# Patient Record
Sex: MALE | Race: WHITE | NOT HISPANIC OR LATINO | URBAN - METROPOLITAN AREA
[De-identification: names, ages, dates, MRNs, and addresses within clinical notes are randomized per-mention and may not be internally consistent; named-entity substitution may affect disease eponyms.]

---
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
## 2021-11-23 PROBLEM — Z00.00 ENCOUNTER FOR PREVENTIVE HEALTH EXAMINATION: Status: ACTIVE | Noted: 2021-11-23

## 2021-11-24 ENCOUNTER — APPOINTMENT (OUTPATIENT)
Dept: FAMILY MEDICINE | Facility: CLINIC | Age: 21
End: 2021-11-24
Payer: COMMERCIAL

## 2021-11-24 VITALS
TEMPERATURE: 97.2 F | WEIGHT: 153 LBS | OXYGEN SATURATION: 99 % | HEART RATE: 85 BPM | HEIGHT: 72 IN | DIASTOLIC BLOOD PRESSURE: 60 MMHG | SYSTOLIC BLOOD PRESSURE: 92 MMHG | BODY MASS INDEX: 20.72 KG/M2

## 2021-11-24 DIAGNOSIS — F45.8 OTHER SOMATOFORM DISORDERS: ICD-10-CM

## 2021-11-24 PROCEDURE — 99214 OFFICE O/P EST MOD 30 MIN: CPT | Mod: 25

## 2021-11-24 PROCEDURE — 36415 COLL VENOUS BLD VENIPUNCTURE: CPT

## 2021-11-24 NOTE — HEALTH RISK ASSESSMENT
[1] : 2) Feeling down, depressed, or hopeless for several days (1) [PHQ-9 Negative - No further assessment needed] : PHQ-9 Negative - No further assessment needed [[id number]] : 2

## 2021-11-24 NOTE — PLAN
[FreeTextEntry1] : 21-year-old male presents to this office complaining of fatigue and occasional dyspnea\par Fatigue–he says for the last 2 weeks he has not been feeling well and might be coming down with a cold\par A CBC and BMP are ordered\par Dyspnea–periods of tachypnea are described worse on exertion.  His O2 sat is 99% on room air\par Hypotension–blood pressure 92/60 no current medications\par Lab work is ordered

## 2021-11-24 NOTE — HISTORY OF PRESENT ILLNESS
[FreeTextEntry1] : Fatigue [de-identified] : 21-year-old gentleman presents with periods of fatigue and breathing issues

## 2021-11-26 LAB
ANION GAP SERPL CALC-SCNC: 10 MMOL/L
BASOPHILS # BLD AUTO: 0.03 K/UL
BASOPHILS NFR BLD AUTO: 0.7 %
BUN SERPL-MCNC: 17 MG/DL
CALCIUM SERPL-MCNC: 9.8 MG/DL
CHLORIDE SERPL-SCNC: 107 MMOL/L
CO2 SERPL-SCNC: 26 MMOL/L
CREAT SERPL-MCNC: 0.97 MG/DL
EOSINOPHIL # BLD AUTO: 0.32 K/UL
EOSINOPHIL NFR BLD AUTO: 7.3 %
GLUCOSE SERPL-MCNC: 88 MG/DL
HCT VFR BLD CALC: 48.7 %
HGB BLD-MCNC: 15.6 G/DL
IMM GRANULOCYTES NFR BLD AUTO: 0.2 %
LYMPHOCYTES # BLD AUTO: 1.57 K/UL
LYMPHOCYTES NFR BLD AUTO: 35.8 %
MAN DIFF?: NORMAL
MCHC RBC-ENTMCNC: 28 PG
MCHC RBC-ENTMCNC: 32 GM/DL
MCV RBC AUTO: 87.3 FL
MONOCYTES # BLD AUTO: 0.61 K/UL
MONOCYTES NFR BLD AUTO: 13.9 %
NEUTROPHILS # BLD AUTO: 1.84 K/UL
NEUTROPHILS NFR BLD AUTO: 42.1 %
PLATELET # BLD AUTO: 221 K/UL
POTASSIUM SERPL-SCNC: 4.6 MMOL/L
RBC # BLD: 5.58 M/UL
RBC # FLD: 12.1 %
SODIUM SERPL-SCNC: 143 MMOL/L
WBC # FLD AUTO: 4.38 K/UL

## 2022-08-14 ENCOUNTER — EMERGENCY (EMERGENCY)
Facility: HOSPITAL | Age: 22
LOS: 1 days | Discharge: HOME | End: 2022-08-14
Admitting: EMERGENCY MEDICINE

## 2022-08-14 DIAGNOSIS — M25.512 PAIN IN LEFT SHOULDER: ICD-10-CM
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
## 2022-08-14 DIAGNOSIS — S42.002A FRACTURE OF UNSPECIFIED PART OF LEFT CLAVICLE, INITIAL ENCOUNTER FOR CLOSED FRACTURE: ICD-10-CM

## 2022-08-14 PROCEDURE — 23500 CLTX CLAVICULAR FX W/O MNPJ: CPT | Mod: 54

## 2022-08-14 PROCEDURE — 73030 X-RAY EXAM OF SHOULDER: CPT | Mod: 26,LT

## 2022-08-14 PROCEDURE — 99284 EMERGENCY DEPT VISIT MOD MDM: CPT | Mod: 57

## 2022-08-14 PROCEDURE — 73000 X-RAY EXAM OF COLLAR BONE: CPT | Mod: 26,LT

## 2022-08-29 ENCOUNTER — OUTPATIENT (OUTPATIENT)
Dept: OUTPATIENT SERVICES | Facility: HOSPITAL | Age: 22
LOS: 1 days | End: 2022-08-29
Payer: MEDICARE

## 2022-08-29 ENCOUNTER — OUTPATIENT (OUTPATIENT)
Dept: OUTPATIENT SERVICES | Facility: HOSPITAL | Age: 22
LOS: 1 days | End: 2022-08-29

## 2022-08-29 PROCEDURE — L9981: CPT
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
## 2022-09-04 DIAGNOSIS — S42.022A DISPLACED FRACTURE OF SHAFT OF LEFT CLAVICLE, INITIAL ENCOUNTER FOR CLOSED FRACTURE: ICD-10-CM
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
## 2022-09-20 DIAGNOSIS — S42.022A DISPLACED FRACTURE OF SHAFT OF LEFT CLAVICLE, INITIAL ENCOUNTER FOR CLOSED FRACTURE: ICD-10-CM

## 2023-05-18 ENCOUNTER — RESULT CHARGE (OUTPATIENT)
Age: 23
End: 2023-05-18

## 2023-05-18 ENCOUNTER — APPOINTMENT (OUTPATIENT)
Dept: FAMILY MEDICINE | Facility: CLINIC | Age: 23
End: 2023-05-18
Payer: COMMERCIAL

## 2023-05-18 VITALS
BODY MASS INDEX: 25.2 KG/M2 | WEIGHT: 180 LBS | HEART RATE: 86 BPM | OXYGEN SATURATION: 97 % | HEIGHT: 71 IN | TEMPERATURE: 97.3 F

## 2023-05-18 DIAGNOSIS — J98.01 ACUTE BRONCHOSPASM: ICD-10-CM

## 2023-05-18 DIAGNOSIS — Q67.6 PECTUS EXCAVATUM: ICD-10-CM

## 2023-05-18 DIAGNOSIS — R04.0 EPISTAXIS: ICD-10-CM

## 2023-05-18 DIAGNOSIS — Z91.09 OTHER ALLERGY STATUS, OTHER THAN TO DRUGS AND BIOLOGICAL SUBSTANCES: ICD-10-CM

## 2023-05-18 PROCEDURE — 81003 URINALYSIS AUTO W/O SCOPE: CPT | Mod: QW

## 2023-05-18 PROCEDURE — 99214 OFFICE O/P EST MOD 30 MIN: CPT | Mod: 25

## 2023-05-23 ENCOUNTER — NON-APPOINTMENT (OUTPATIENT)
Age: 23
End: 2023-05-23

## 2023-05-23 ENCOUNTER — APPOINTMENT (OUTPATIENT)
Dept: PULMONOLOGY | Facility: CLINIC | Age: 23
End: 2023-05-23
Payer: COMMERCIAL

## 2023-05-23 VITALS
OXYGEN SATURATION: 98 % | SYSTOLIC BLOOD PRESSURE: 110 MMHG | BODY MASS INDEX: 25.62 KG/M2 | HEART RATE: 82 BPM | DIASTOLIC BLOOD PRESSURE: 70 MMHG | HEIGHT: 71 IN | TEMPERATURE: 97.2 F | WEIGHT: 183 LBS

## 2023-05-23 DIAGNOSIS — J32.9 CHRONIC SINUSITIS, UNSPECIFIED: ICD-10-CM

## 2023-05-23 LAB
BILIRUB UR QL STRIP: NEGATIVE
CLARITY UR: CLEAR
COLLECTION METHOD: NORMAL
GLUCOSE UR-MCNC: NEGATIVE
HCG UR QL: 1 EU/DL
HGB UR QL STRIP.AUTO: NEGATIVE
KETONES UR-MCNC: NEGATIVE
LEUKOCYTE ESTERASE UR QL STRIP: NEGATIVE
NITRITE UR QL STRIP: NEGATIVE
PH UR STRIP: 7
PROT UR STRIP-MCNC: 30
SP GR UR STRIP: 1.02

## 2023-05-23 PROCEDURE — 99204 OFFICE O/P NEW MOD 45 MIN: CPT | Mod: 25

## 2023-05-23 PROCEDURE — 94010 BREATHING CAPACITY TEST: CPT

## 2023-05-23 RX ORDER — LORATADINE 5 MG/5 ML
SOLUTION, ORAL ORAL
Refills: 0 | Status: ACTIVE | COMMUNITY

## 2023-05-23 RX ORDER — IPRATROPIUM BROMIDE 21 UG/1
0.03 SPRAY NASAL TWICE DAILY
Qty: 1 | Refills: 6 | Status: ACTIVE | COMMUNITY
Start: 2023-05-23 | End: 1900-01-01

## 2023-05-23 RX ORDER — CETIRIZINE HCL 10 MG
TABLET ORAL
Refills: 0 | Status: ACTIVE | COMMUNITY

## 2023-05-23 RX ORDER — FLUTICASONE PROPIONATE 50 MCG
SPRAY, SUSPENSION NASAL
Refills: 0 | Status: ACTIVE | COMMUNITY

## 2023-05-23 NOTE — HISTORY OF PRESENT ILLNESS
[Never] : never [TextBox_4] : 22 male no hx tobacco no vaping\par Presents today because  recent visit to PCP  for 2 months of  dyspnea and stuffy feeling  and breathing thru mouth\par dx severe allergies  and asthma  \par Pt to see allergist \par no meds started\par sl  wheeze  and min issue nasal congestion and occ epistaxis\par no pruritis or pharyngitis\par precipitating factors worse in heat and cam feel worse and lying down\par lives with dog  no issue prior no cats or birds\par no new environment   recent returned from college and had sx there as well\par student  graduated

## 2023-05-23 NOTE — REASON FOR VISIT
[Initial] : an initial visit [Cough] : cough [Shortness of Breath] : shortness of breath [Wheezing] : wheezing [TextBox_44] : Pt states he has had a hard time breathing through his nose for about 2 months and has been worse for the past 3 weeks. Pt states he has been getting a lot of nose bleeds while trying to relieve his symptoms. Pt states he sometimes has a wecough that turns into a dry cough after getting up the phlegm, SOB with and without activity. Pt states his PCP hears "cracking" in his chest.

## 2023-05-23 NOTE — PROCEDURE
[FreeTextEntry1] : Spirometry shows no obstructive or restrictive disease.  predictive value and percentage is incorrect because height entered incorrectly.

## 2023-05-23 NOTE — DISCUSSION/SUMMARY
[FreeTextEntry1] : Mr Sykes complains of significant chest congestion and sinus congestion.  It sounds more like allergies rather than asthma.  Although the symptoms have been for several months there is no definite new environmental allergen.  I have asked him to obtain sinus x-rays and a chest x-ray.  At this time I do not want to start him on any asthma medication.  I think he benefit from a nasal decongestant.  I have asked him to start Atrovent nasal spray 2 sprays in each nostril twice a day.  He will be going to an allergist where full allergy testing will be done.\par The patient understands and agrees with plan of care.\par Today's office visit encompassed 46  minutes. I conducted an extensive history ,physical exam and reviewed diagnosis and treatment options  including diagnostic tests,radiologic studies including cat scans  and the use of prescription medication.

## 2023-05-23 NOTE — PHYSICAL EXAM
[No Acute Distress] : no acute distress [Normal Oropharynx] : normal oropharynx [Normal Appearance] : normal appearance [No Neck Mass] : no neck mass [Normal Rate/Rhythm] : normal rate/rhythm [Normal S1, S2] : normal s1, s2 [No Murmurs] : no murmurs [No Resp Distress] : no resp distress [Clear to Auscultation Bilaterally] : clear to auscultation bilaterally [No Abnormalities] : no abnormalities [Benign] : benign [Normal Gait] : normal gait [No Clubbing] : no clubbing [No Cyanosis] : no cyanosis [No Edema] : no edema [FROM] : FROM [Normal Color/ Pigmentation] : normal color/ pigmentation [No Focal Deficits] : no focal deficits [Oriented x3] : oriented x3 [Normal Affect] : normal affect [TextBox_11] : minimal tender over sinus

## 2023-06-07 NOTE — HEALTH RISK ASSESSMENT
[0] : 2) Feeling down, depressed, or hopeless: Not at all (0) [PHQ-2 Negative - No further assessment needed] : PHQ-2 Negative - No further assessment needed [[id number]] : 0

## 2023-06-07 NOTE — PLAN
[FreeTextEntry1] : 22-year-old male presents for evaluation\par Epistaxis–frequent nosebleeds–physical exam is unremarkable some punctate lesions in the turbinates.  He is advised to keep the nasal passages moist\par Atypical chest pain–complaining of bronchospasm.  He suffers from pectus excavatum.\par Pulmonary consult is obtained\par Environmental allergies–symptoms probably related to seasonal allergies.  He is referred to allergy for testing

## 2023-06-19 ENCOUNTER — LABORATORY RESULT (OUTPATIENT)
Age: 23
End: 2023-06-19

## 2023-06-19 ENCOUNTER — APPOINTMENT (OUTPATIENT)
Dept: PEDIATRIC ALLERGY IMMUNOLOGY | Facility: CLINIC | Age: 23
End: 2023-06-19
Payer: COMMERCIAL

## 2023-06-19 ENCOUNTER — NON-APPOINTMENT (OUTPATIENT)
Age: 23
End: 2023-06-19

## 2023-06-19 VITALS
WEIGHT: 187 LBS | TEMPERATURE: 97.2 F | SYSTOLIC BLOOD PRESSURE: 100 MMHG | DIASTOLIC BLOOD PRESSURE: 64 MMHG | BODY MASS INDEX: 26.77 KG/M2 | HEART RATE: 64 BPM | OXYGEN SATURATION: 97 % | HEIGHT: 70 IN

## 2023-06-19 DIAGNOSIS — Z86.19 PERSONAL HISTORY OF OTHER INFECTIOUS AND PARASITIC DISEASES: ICD-10-CM

## 2023-06-19 DIAGNOSIS — Z78.9 OTHER SPECIFIED HEALTH STATUS: ICD-10-CM

## 2023-06-19 DIAGNOSIS — R06.02 SHORTNESS OF BREATH: ICD-10-CM

## 2023-06-19 DIAGNOSIS — J30.9 ALLERGIC RHINITIS, UNSPECIFIED: ICD-10-CM

## 2023-06-19 PROCEDURE — 94010 BREATHING CAPACITY TEST: CPT

## 2023-06-19 PROCEDURE — 99203 OFFICE O/P NEW LOW 30 MIN: CPT | Mod: 25

## 2023-06-19 NOTE — REASON FOR VISIT
[Evaluation/Consultation] : an evaluation/consultation of [Allergic Rhinitis] : allergic rhinitis [Shortness of Breath] : shortness of breath

## 2023-06-20 ENCOUNTER — NON-APPOINTMENT (OUTPATIENT)
Age: 23
End: 2023-06-20

## 2023-06-20 NOTE — ASSESSMENT
[FreeTextEntry1] : Allergic rhinitis: Severe symptoms this spring.  Blood work for allergies and total IgE.  Skin testing could not be done, because he took the last antihistamine 3 to 4 days ago.  May resume Flonase, continue Atrovent nasal spray and antihistamine.  Follow-up with blood work results.\par Shortness of breath: Spirometry was performed today, and it was excellent.  No need for inhaler at this time.  We will decide after blood work for allergies is back.  Follow-up in 2 to 3 weeks.

## 2023-06-20 NOTE — SOCIAL HISTORY
[de-identified] : House with oil heating, central air conditioning, carpet in the bedroom, 1 dog, no cigarette smoke exposure.  Graduated college, lived in an apartment while in school.

## 2023-06-20 NOTE — HISTORY OF PRESENT ILLNESS
[de-identified] : In office to be evaluated for allergies and shortness of breath.\par Symptoms of 2 months duration, started while in college in [place] and worsened upon return home in early May in [place], consisting of severe stuffy nose, occasional epistaxis, cough, and sometimes shortness of breath.  Initially tried Zyrtec and Flonase.  Subsequently was prescribed Atrovent nasal spray.  All provided some relief but not complete.  No inhalers.  Now he feels better, does not need medication every day anymore.  Evaluated by pulmonary physician on 5/23/2023, symptoms attributed mostly to allergies.  Spirometry was done at incorrect height.  Last antihistamine 3 to 4 days ago.\par Had milder spring allergies in previous years, for which he used Claritin or Zyrtec.  Had no history of asthma.  Had whooping cough in high school and known with pectus excavatum.  Does not require frequent antibiotics for respiratory infections.

## 2023-06-20 NOTE — PHYSICAL EXAM
[Alert] : alert [No Acute Distress] : no acute distress [Normal Voice/Communication] : normal voice communication [Supple] : the neck was supple [Normal Cervical Lymph Nodes] : cervical [Skin Intact] : skin intact  [No Rash] : no rash [No clubbing] : no clubbing [No Cyanosis] : no cyanosis [Alert, Awake, Oriented as Age-Appropriate] : alert, awake, oriented as age appropriate [de-identified] : Eyes clear. [de-identified] : Throat clear.  Nasal mucosa pink, mild stuffy nose bilateral, scant white discharge.  Tympanic membrane normal on the right and some wax on the left.  No sinus tenderness. [de-identified] : Chest clear, good air entry, no wheezing or crackles. [de-identified] : S1-S2 regular, no murmurs. [de-identified] : Abdomen soft, not tender, no organomegaly.

## 2023-06-20 NOTE — REVIEW OF SYSTEMS
[Nosebleeds] : epistaxis [Nasal Congestion] : nasal congestion [Difficulty Breathing] : dyspnea [Recurrent Sinus Infections] : no recurrent sinus infections [Recurrent Throat Infections] : no recurrence of throat infections [Recurrent Bronchitis] : no recurrent bronchitis [Recurrent Ear Infections] : no recurrence or ear infections [Recurrent Skin Infections] : no recurrent skin infections [Recurrent Pneumonia] : no ~T recurrent pneumonia

## 2023-06-22 ENCOUNTER — NON-APPOINTMENT (OUTPATIENT)
Age: 23
End: 2023-06-22

## 2023-06-23 ENCOUNTER — APPOINTMENT (OUTPATIENT)
Dept: PULMONOLOGY | Facility: CLINIC | Age: 23
End: 2023-06-23

## 2023-07-20 ENCOUNTER — APPOINTMENT (OUTPATIENT)
Dept: PEDIATRIC ALLERGY IMMUNOLOGY | Facility: CLINIC | Age: 23
End: 2023-07-20
Payer: COMMERCIAL

## 2023-07-20 VITALS
OXYGEN SATURATION: 97 % | TEMPERATURE: 98.3 F | DIASTOLIC BLOOD PRESSURE: 73 MMHG | HEART RATE: 72 BPM | SYSTOLIC BLOOD PRESSURE: 116 MMHG

## 2023-07-20 DIAGNOSIS — J30.81 ALLERGIC RHINITIS DUE TO ANIMAL (CAT) (DOG) HAIR AND DANDER: ICD-10-CM

## 2023-07-20 DIAGNOSIS — J30.89 OTHER ALLERGIC RHINITIS: ICD-10-CM

## 2023-07-20 PROCEDURE — 99213 OFFICE O/P EST LOW 20 MIN: CPT

## 2023-07-20 NOTE — PHYSICAL EXAM
[Alert] : alert [No Acute Distress] : no acute distress [Normal Voice/Communication] : normal voice communication [Supple] : the neck was supple [Soft] : abdomen soft [Normal Cervical Lymph Nodes] : cervical [No clubbing] : no clubbing [No Cyanosis] : no cyanosis [de-identified] : Eyes clear, mild shiners. [de-identified] : Throat clear.  Nasal mucosa pink, narrow nasal passages, mild bilateral stuffy nose, scant white discharge.  Tympanic membranes with some wax.  No sinus tenderness. [de-identified] : Chest clear, good air entry, no wheezing or crackles. [de-identified] : S1-S2 regular, no murmurs.

## 2023-07-20 NOTE — ASSESSMENT
[FreeTextEntry1] : Allergic rhinitis (pollen, dust mites, dander): Blood work was reviewed and copy was given.  Decrease exposure to dust mites, pollen, dander.  Avoid getting cat.  Start medications daily from April 1, in preparation of spring (Zyrtec, Flonase).  Consider azelastine or olopatadine nasal spray, instead of Atrovent.  When symptoms are severe, may use Zyrtec-D instead of Zyrtec.  Needs to use medications through July 4.  Rest of the year, use medications as needed.\par History of shortness of breath: No symptoms currently, spirometry was normal last visit.  Most likely due to allergies.\par Follow-up as needed.

## 2023-07-20 NOTE — HISTORY OF PRESENT ILLNESS
[de-identified] : In office for follow-up for allergic rhinitis.  Evaluated on 2/19/2023 for significant allergies in the spring, treated with Zyrtec, Flonase, and Atrovent nasal spray, with incomplete relief.  He also had shortness of breath.  During that visit, spirometry was normal.  He was referred for blood work, that showed IgE to tree pollen, grass pollen, dust mites, and cat.  CBC and total IgE were normal.\par Today he reports feeling much better.  He uses nasal sprays only twice from the last visit, he does not take Zyrtec daily anymore.  He went to college in [place], where the apartment was jermaine and had a cat.  He graduated.  He symptoms started while she was there and got worse when he returned home in May.  He also had epistaxis when the allergy symptoms were severe.

## 2023-07-20 NOTE — SOCIAL HISTORY
[de-identified] : House with oil heating, central air conditioning, carpet in the bedroom, 1 dog, no cigarette smoke exposure.

## 2023-07-20 NOTE — REVIEW OF SYSTEMS
[Nasal Congestion] : nasal congestion [Recurrent Sinus Infections] : no recurrent sinus infections [Recurrent Throat Infections] : no recurrence of throat infections [Recurrent Bronchitis] : no recurrent bronchitis [Recurrent Ear Infections] : no recurrence or ear infections [Recurrent Skin Infections] : no recurrent skin infections [Recurrent Pneumonia] : no ~T recurrent pneumonia

## 2023-09-13 ENCOUNTER — APPOINTMENT (OUTPATIENT)
Dept: FAMILY MEDICINE | Facility: CLINIC | Age: 23
End: 2023-09-13
Payer: SELF-PAY

## 2023-09-13 VITALS
OXYGEN SATURATION: 97 % | WEIGHT: 187.04 LBS | HEART RATE: 68 BPM | BODY MASS INDEX: 26.19 KG/M2 | RESPIRATION RATE: 16 BRPM | SYSTOLIC BLOOD PRESSURE: 110 MMHG | DIASTOLIC BLOOD PRESSURE: 60 MMHG | HEIGHT: 71 IN | TEMPERATURE: 97.4 F

## 2023-09-13 DIAGNOSIS — Z02.0 ENCOUNTER FOR EXAMINATION FOR ADMISSION TO EDUCATIONAL INSTITUTION: ICD-10-CM

## 2023-09-13 PROCEDURE — 99395 PREV VISIT EST AGE 18-39: CPT | Mod: 25

## 2023-09-13 PROCEDURE — G0008: CPT | Mod: 59

## 2023-09-13 PROCEDURE — 90472 IMMUNIZATION ADMIN EACH ADD: CPT

## 2023-09-13 PROCEDURE — 90686 IIV4 VACC NO PRSV 0.5 ML IM: CPT

## 2023-09-13 PROCEDURE — 90715 TDAP VACCINE 7 YRS/> IM: CPT

## 2023-09-15 ENCOUNTER — MED ADMIN CHARGE (OUTPATIENT)
Age: 23
End: 2023-09-15

## 2023-09-22 ENCOUNTER — APPOINTMENT (OUTPATIENT)
Dept: FAMILY MEDICINE | Facility: CLINIC | Age: 23
End: 2023-09-22
Payer: SELF-PAY

## 2023-09-22 LAB
HBV SURFACE AB SER QL: NONREACTIVE
MEV IGG FLD QL IA: 265 AU/ML
MEV IGG+IGM SER-IMP: POSITIVE
MUV AB SER-ACNC: NORMAL
MUV IGG SER QL IA: 9.3 AU/ML
RUBV IGG FLD-ACNC: 3.4 INDEX
RUBV IGG SER-IMP: POSITIVE
VZV AB TITR SER: POSITIVE
VZV IGG SER IF-ACNC: 196.7 INDEX

## 2023-09-22 PROCEDURE — 90746 HEPB VACCINE 3 DOSE ADULT IM: CPT

## 2023-09-22 PROCEDURE — 86580 TB INTRADERMAL TEST: CPT

## 2023-09-22 PROCEDURE — 90471 IMMUNIZATION ADMIN: CPT

## 2023-09-22 PROCEDURE — 90707 MMR VACCINE SC: CPT

## 2023-10-23 ENCOUNTER — APPOINTMENT (OUTPATIENT)
Dept: FAMILY MEDICINE | Facility: CLINIC | Age: 23
End: 2023-10-23
Payer: COMMERCIAL

## 2023-10-23 DIAGNOSIS — Z23 ENCOUNTER FOR IMMUNIZATION: ICD-10-CM

## 2023-10-23 PROCEDURE — 90472 IMMUNIZATION ADMIN EACH ADD: CPT

## 2023-10-23 PROCEDURE — 90707 MMR VACCINE SC: CPT

## 2023-10-23 PROCEDURE — 90471 IMMUNIZATION ADMIN: CPT

## 2023-10-23 PROCEDURE — 90746 HEPB VACCINE 3 DOSE ADULT IM: CPT

## 2023-12-21 ENCOUNTER — APPOINTMENT (OUTPATIENT)
Dept: FAMILY MEDICINE | Facility: CLINIC | Age: 23
End: 2023-12-21
Payer: COMMERCIAL

## 2023-12-21 VITALS
SYSTOLIC BLOOD PRESSURE: 110 MMHG | HEIGHT: 71 IN | OXYGEN SATURATION: 98 % | HEART RATE: 83 BPM | DIASTOLIC BLOOD PRESSURE: 60 MMHG | WEIGHT: 188 LBS | TEMPERATURE: 97.9 F | BODY MASS INDEX: 26.32 KG/M2

## 2023-12-21 DIAGNOSIS — J02.9 ACUTE PHARYNGITIS, UNSPECIFIED: ICD-10-CM

## 2023-12-21 DIAGNOSIS — J30.1 ALLERGIC RHINITIS DUE TO POLLEN: ICD-10-CM

## 2023-12-21 PROCEDURE — 99214 OFFICE O/P EST MOD 30 MIN: CPT

## 2023-12-21 RX ORDER — AMOXICILLIN 500 MG/1
500 TABLET, FILM COATED ORAL 3 TIMES DAILY
Qty: 30 | Refills: 0 | Status: ACTIVE | COMMUNITY
Start: 2023-12-21 | End: 1900-01-01

## 2023-12-21 NOTE — PLAN
[FreeTextEntry1] : 23-year-old gentleman presents for evaluation Exudative tonsillitis-low-grade fever URI symptomatology for several days.  Fully swabbed and cultured empiric amoxicillin 500 mg 3 times daily for 10 days Suffering with laryngitis and productive cough Strong history of allergic rhinitis and chronic fatigue Iatrogenic hypotension-offers no related symptoms

## 2023-12-21 NOTE — HEALTH RISK ASSESSMENT
[0] : 2) Feeling down, depressed, or hopeless: Not at all (0) [PHQ-2 Negative - No further assessment needed] : PHQ-2 Negative - No further assessment needed [[id number]] : 0

## 2024-01-04 LAB
BACTERIA THROAT CULT: NORMAL
INFLUENZA A RESULT: NOT DETECTED
INFLUENZA B RESULT: NOT DETECTED
RESP SYN VIRUS RESULT: NOT DETECTED
SARS-COV-2 RESULT: NOT DETECTED

## 2024-01-16 ENCOUNTER — APPOINTMENT (OUTPATIENT)
Dept: FAMILY MEDICINE | Facility: CLINIC | Age: 24
End: 2024-01-16

## 2024-02-16 ENCOUNTER — APPOINTMENT (OUTPATIENT)
Dept: FAMILY MEDICINE | Facility: CLINIC | Age: 24
End: 2024-02-16
Payer: COMMERCIAL

## 2024-02-16 DIAGNOSIS — J10.1 INFLUENZA DUE TO OTHER IDENTIFIED INFLUENZA VIRUS WITH OTHER RESPIRATORY MANIFESTATIONS: ICD-10-CM

## 2024-02-16 DIAGNOSIS — I95.89 OTHER HYPOTENSION: ICD-10-CM

## 2024-02-16 DIAGNOSIS — R53.82 CHRONIC FATIGUE, UNSPECIFIED: ICD-10-CM

## 2024-02-16 PROCEDURE — 87880 STREP A ASSAY W/OPTIC: CPT | Mod: QW

## 2024-02-16 PROCEDURE — 99214 OFFICE O/P EST MOD 30 MIN: CPT

## 2024-02-16 PROCEDURE — 87804 INFLUENZA ASSAY W/OPTIC: CPT | Mod: QW

## 2024-02-16 RX ORDER — OSELTAMIVIR PHOSPHATE 75 MG/1
75 CAPSULE ORAL TWICE DAILY
Qty: 10 | Refills: 0 | Status: ACTIVE | COMMUNITY
Start: 2024-02-16 | End: 1900-01-01

## 2024-02-16 NOTE — PLAN
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

## 2024-02-22 LAB
FLUAV SPEC QL CULT: POSITIVE
FLUBV AG SPEC QL IA: NEGATIVE
S PYO AG SPEC QL IA: NEGATIVE

## 2024-03-22 ENCOUNTER — APPOINTMENT (OUTPATIENT)
Dept: FAMILY MEDICINE | Facility: CLINIC | Age: 24
End: 2024-03-22

## 2024-04-17 ENCOUNTER — APPOINTMENT (OUTPATIENT)
Dept: FAMILY MEDICINE | Facility: CLINIC | Age: 24
End: 2024-04-17
Payer: COMMERCIAL

## 2024-04-17 VITALS
SYSTOLIC BLOOD PRESSURE: 110 MMHG | OXYGEN SATURATION: 98 % | TEMPERATURE: 98.2 F | DIASTOLIC BLOOD PRESSURE: 70 MMHG | HEIGHT: 71 IN | WEIGHT: 185 LBS | HEART RATE: 79 BPM | BODY MASS INDEX: 25.9 KG/M2

## 2024-04-17 DIAGNOSIS — N50.819 TESTICULAR PAIN, UNSPECIFIED: ICD-10-CM

## 2024-04-17 PROCEDURE — 99212 OFFICE O/P EST SF 10 MIN: CPT

## 2024-04-17 NOTE — PLAN
[FreeTextEntry1] : Testicular pain- discussed with patient, due to severity of pain and anterosuperior pain on left testicle. Patient is advised to go to ER for further evaluation with US for r/o of testicular torsion. Patient verbalized understanding; states he will go to [facility].

## 2024-04-17 NOTE — HISTORY OF PRESENT ILLNESS
[FreeTextEntry8] : Patient presents for 3 days of left thigh, left testicle pain that has progressively worsened.  Pain is intermittent, at worst a 7/10, at best a 4/10. Denies difficulty urinating, fevers or other symptoms at this time. He attempted to detorse his testicle yesterday based on something he read online.

## 2024-04-17 NOTE — PHYSICAL EXAM
[Testes Mass (___cm)] : there were no testicular masses [Normal] : affect was normal and insight and judgment were intact [de-identified] : chaperone during exam- [employer] Biggs.

## 2025-03-26 ENCOUNTER — APPOINTMENT (OUTPATIENT)
Dept: FAMILY MEDICINE | Facility: CLINIC | Age: 25
End: 2025-03-26
Payer: COMMERCIAL

## 2025-03-26 VITALS
HEIGHT: 71 IN | HEART RATE: 88 BPM | SYSTOLIC BLOOD PRESSURE: 108 MMHG | DIASTOLIC BLOOD PRESSURE: 64 MMHG | RESPIRATION RATE: 16 BRPM | OXYGEN SATURATION: 98 % | TEMPERATURE: 97.9 F | BODY MASS INDEX: 26.46 KG/M2 | WEIGHT: 189 LBS

## 2025-03-26 DIAGNOSIS — R15.9 FULL INCONTINENCE OF FECES: ICD-10-CM

## 2025-03-26 DIAGNOSIS — Q67.6 PECTUS EXCAVATUM: ICD-10-CM

## 2025-03-26 PROCEDURE — 99395 PREV VISIT EST AGE 18-39: CPT

## 2025-03-26 NOTE — PLAN
[FreeTextEntry1] : 24-year-old male presents for school physical exam Titers reviewed Physical exam unremarkable Pectus excavatum-complaining of shortness of breath and dyspnea on exertion.  He is referred to pulmonary for pulmonary function studies and reassurance Stool incontinence-he describes the inability to completely clean himself He is referred to colorectal surgery for evaluation and pressure studies and reassurance

## 2025-04-15 ENCOUNTER — APPOINTMENT (OUTPATIENT)
Dept: CT IMAGING | Facility: CLINIC | Age: 25
End: 2025-04-15

## 2025-04-15 NOTE — IMPRESSION
No care due was identified.  Health [facility] Embedded Care Due Messages. Reference number: [id number].   4/14/2025 7:26:21 PM CDT   [Spirometry] : Spirometry [Normal Spirometry] : spirometry normal

## 2025-04-18 ENCOUNTER — APPOINTMENT (OUTPATIENT)
Dept: FAMILY MEDICINE | Facility: CLINIC | Age: 25
End: 2025-04-18
Payer: COMMERCIAL

## 2025-04-18 VITALS
BODY MASS INDEX: 25.9 KG/M2 | OXYGEN SATURATION: 97 % | RESPIRATION RATE: 16 BRPM | HEART RATE: 70 BPM | SYSTOLIC BLOOD PRESSURE: 110 MMHG | WEIGHT: 185 LBS | HEIGHT: 71 IN | TEMPERATURE: 97.4 F | DIASTOLIC BLOOD PRESSURE: 72 MMHG

## 2025-04-18 DIAGNOSIS — I95.89 OTHER HYPOTENSION: ICD-10-CM

## 2025-04-18 DIAGNOSIS — K12.0 RECURRENT ORAL APHTHAE: ICD-10-CM

## 2025-04-18 DIAGNOSIS — J02.9 ACUTE PHARYNGITIS, UNSPECIFIED: ICD-10-CM

## 2025-04-18 PROCEDURE — 99214 OFFICE O/P EST MOD 30 MIN: CPT

## 2025-04-18 RX ORDER — ACYCLOVIR 800 MG/1
800 TABLET ORAL
Qty: 25 | Refills: 1 | Status: ACTIVE | COMMUNITY
Start: 2025-04-18 | End: 1900-01-01

## 2025-04-18 NOTE — PLAN
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

## 2025-04-27 LAB — BACTERIA THROAT CULT: NORMAL

## 2025-06-10 ENCOUNTER — APPOINTMENT (OUTPATIENT)
Dept: FAMILY MEDICINE | Facility: CLINIC | Age: 25
End: 2025-06-10
Payer: COMMERCIAL

## 2025-06-10 VITALS
DIASTOLIC BLOOD PRESSURE: 76 MMHG | HEART RATE: 71 BPM | OXYGEN SATURATION: 96 % | BODY MASS INDEX: 26.04 KG/M2 | SYSTOLIC BLOOD PRESSURE: 115 MMHG | TEMPERATURE: 97.8 F | HEIGHT: 71 IN | WEIGHT: 186 LBS

## 2025-06-10 LAB — RESULT: NEGATIVE

## 2025-06-10 PROCEDURE — 96372 THER/PROPH/DIAG INJ SC/IM: CPT

## 2025-06-10 PROCEDURE — 99214 OFFICE O/P EST MOD 30 MIN: CPT | Mod: 25

## 2025-06-10 RX ORDER — LIDOCAINE HYDROCHLORIDE 40 MG/ML
4 SOLUTION TOPICAL
Qty: 2 | Refills: 0 | Status: ACTIVE | COMMUNITY
Start: 2025-06-10 | End: 1900-01-01

## 2025-06-10 RX ORDER — BENZOCAINE/MENTHOL 15 MG-10MG
15-10 LOZENGE MUCOUS MEMBRANE 3 TIMES DAILY
Qty: 1 | Refills: 0 | Status: ACTIVE | COMMUNITY
Start: 2025-06-10 | End: 1900-01-01

## 2025-06-10 RX ORDER — AZITHROMYCIN 500 MG/1
500 TABLET, FILM COATED ORAL DAILY
Qty: 1 | Refills: 0 | Status: ACTIVE | COMMUNITY
Start: 2025-06-10 | End: 1900-01-01

## 2025-06-10 RX ORDER — DIPHENHYDRAMINE HYDROCHLORIDE 50 MG/ML
50 INJECTION, SOLUTION INTRAMUSCULAR; INTRAVENOUS
Qty: 1 | Refills: 0 | Status: COMPLETED | OUTPATIENT
Start: 2025-06-10

## 2025-06-10 RX ORDER — DEXAMETHASONE SODIUM PHOSPHATE 10 MG/ML
10 INJECTION INTRAMUSCULAR; INTRAVENOUS
Qty: 0 | Refills: 0 | Status: COMPLETED | OUTPATIENT
Start: 2025-06-10

## 2025-06-10 RX ADMIN — DEXAMETHASONE SODIUM PHOSPHATE 0 MG/ML: 10 INJECTION INTRAMUSCULAR; INTRAVENOUS at 00:00

## 2025-06-10 RX ADMIN — DIPHENHYDRAMINE HYDROCHLORIDE MG/ML: 50 INJECTION, SOLUTION INTRAMUSCULAR; INTRAVENOUS at 00:00

## 2025-06-10 NOTE — REVIEW OF SYSTEMS
[Fever] : no fever [Chills] : no chills [Shortness Of Breath] : shortness of breath [Negative] : Cardiovascular

## 2025-06-10 NOTE — ASSESSMENT
[FreeTextEntry1] : 24M here for eval of severe sore throat. No significant PMH/PSH. No acute distress, vitals were reviewed and are reassuring. PE was grossly stable. Reports he has 7/10 sore throat that has been worsening since the weekend. States he was out on Friday night in [place] and had 3 drinks. Woke up with sore throat and flew back to [place]; it has worsened over the last three day. States he feels his airway has become tighter and he feels it is harder to breathe, painful to swallow. He has no other symptoms of URI or illness--Denies fever, malaise, rhinorrhea etc. Denies injestion of new foods, substances, or allergens. He has not taken any medications OTC for relief. +Cervical lymphadenopathy, +oropharyngeal redness and tonsillar enlargement  Sore Throat- suspicious for viral illness, possible mono Negative Strep result.  Empiric Z Pack, initiate symptomatic care with lidocaine and lozenges etc., take motrin or tylenol IM Dexamethasone IM Benadryl Strict ER precautions were reviewed with patient.